# Patient Record
Sex: MALE | Race: OTHER | URBAN - METROPOLITAN AREA
[De-identification: names, ages, dates, MRNs, and addresses within clinical notes are randomized per-mention and may not be internally consistent; named-entity substitution may affect disease eponyms.]

---

## 2023-02-14 ENCOUNTER — EMERGENCY (EMERGENCY)
Facility: HOSPITAL | Age: 23
LOS: 0 days | Discharge: HOME | End: 2023-02-14
Attending: STUDENT IN AN ORGANIZED HEALTH CARE EDUCATION/TRAINING PROGRAM
Payer: COMMERCIAL

## 2023-02-14 VITALS
TEMPERATURE: 99 F | DIASTOLIC BLOOD PRESSURE: 88 MMHG | HEART RATE: 80 BPM | RESPIRATION RATE: 15 BRPM | OXYGEN SATURATION: 100 % | SYSTOLIC BLOOD PRESSURE: 146 MMHG

## 2023-02-14 VITALS
TEMPERATURE: 98 F | HEART RATE: 63 BPM | OXYGEN SATURATION: 95 % | RESPIRATION RATE: 18 BRPM | DIASTOLIC BLOOD PRESSURE: 100 MMHG | HEIGHT: 71 IN | SYSTOLIC BLOOD PRESSURE: 150 MMHG | WEIGHT: 199.96 LBS

## 2023-02-14 DIAGNOSIS — Z20.822 CONTACT WITH AND (SUSPECTED) EXPOSURE TO COVID-19: ICD-10-CM

## 2023-02-14 DIAGNOSIS — F32.9 MAJOR DEPRESSIVE DISORDER, SINGLE EPISODE, UNSPECIFIED: ICD-10-CM

## 2023-02-14 DIAGNOSIS — R53.1 WEAKNESS: ICD-10-CM

## 2023-02-14 DIAGNOSIS — F32.A DEPRESSION, UNSPECIFIED: ICD-10-CM

## 2023-02-14 DIAGNOSIS — F41.8 OTHER SPECIFIED ANXIETY DISORDERS: ICD-10-CM

## 2023-02-14 DIAGNOSIS — R29.700 NIHSS SCORE 0: ICD-10-CM

## 2023-02-14 LAB
ALBUMIN SERPL ELPH-MCNC: 3.8 G/DL — SIGNIFICANT CHANGE UP (ref 3.3–5)
ALP SERPL-CCNC: 84 U/L — SIGNIFICANT CHANGE UP (ref 40–120)
ALT FLD-CCNC: 34 U/L — SIGNIFICANT CHANGE UP (ref 12–78)
AMPHET UR-MCNC: NEGATIVE — SIGNIFICANT CHANGE UP
ANION GAP SERPL CALC-SCNC: 6 MMOL/L — SIGNIFICANT CHANGE UP (ref 5–17)
APAP SERPL-MCNC: < 2 UG/ML (ref 10–30)
APPEARANCE UR: CLEAR — SIGNIFICANT CHANGE UP
APTT BLD: 26.8 SEC — LOW (ref 27.5–35.5)
AST SERPL-CCNC: 30 U/L — SIGNIFICANT CHANGE UP (ref 15–37)
BARBITURATES UR SCN-MCNC: NEGATIVE — SIGNIFICANT CHANGE UP
BASOPHILS # BLD AUTO: 0.02 K/UL — SIGNIFICANT CHANGE UP (ref 0–0.2)
BASOPHILS NFR BLD AUTO: 0.3 % — SIGNIFICANT CHANGE UP (ref 0–2)
BENZODIAZ UR-MCNC: NEGATIVE — SIGNIFICANT CHANGE UP
BILIRUB SERPL-MCNC: 0.3 MG/DL — SIGNIFICANT CHANGE UP (ref 0.2–1.2)
BILIRUB UR-MCNC: NEGATIVE — SIGNIFICANT CHANGE UP
BUN SERPL-MCNC: 13 MG/DL — SIGNIFICANT CHANGE UP (ref 7–23)
CALCIUM SERPL-MCNC: 8.9 MG/DL — SIGNIFICANT CHANGE UP (ref 8.5–10.1)
CHLORIDE SERPL-SCNC: 108 MMOL/L — SIGNIFICANT CHANGE UP (ref 96–108)
CK SERPL-CCNC: 268 U/L — SIGNIFICANT CHANGE UP (ref 26–308)
CO2 SERPL-SCNC: 29 MMOL/L — SIGNIFICANT CHANGE UP (ref 22–31)
COCAINE METAB.OTHER UR-MCNC: NEGATIVE — SIGNIFICANT CHANGE UP
COLOR SPEC: YELLOW — SIGNIFICANT CHANGE UP
CREAT SERPL-MCNC: 1.01 MG/DL — SIGNIFICANT CHANGE UP (ref 0.5–1.3)
DIFF PNL FLD: NEGATIVE — SIGNIFICANT CHANGE UP
EGFR: 106 ML/MIN/1.73M2 — SIGNIFICANT CHANGE UP
EOSINOPHIL # BLD AUTO: 0.04 K/UL — SIGNIFICANT CHANGE UP (ref 0–0.5)
EOSINOPHIL NFR BLD AUTO: 0.6 % — SIGNIFICANT CHANGE UP (ref 0–6)
EPI CELLS # UR: SIGNIFICANT CHANGE UP
ETHANOL SERPL-MCNC: <10 MG/DL — SIGNIFICANT CHANGE UP (ref 0–10)
FLUAV AG NPH QL: SIGNIFICANT CHANGE UP
FLUBV AG NPH QL: SIGNIFICANT CHANGE UP
GLUCOSE BLDC GLUCOMTR-MCNC: 75 MG/DL — SIGNIFICANT CHANGE UP (ref 70–99)
GLUCOSE SERPL-MCNC: 81 MG/DL — SIGNIFICANT CHANGE UP (ref 70–99)
GLUCOSE UR QL: NEGATIVE MG/DL — SIGNIFICANT CHANGE UP
HCT VFR BLD CALC: 47.3 % — SIGNIFICANT CHANGE UP (ref 39–50)
HGB BLD-MCNC: 15 G/DL — SIGNIFICANT CHANGE UP (ref 13–17)
IMM GRANULOCYTES NFR BLD AUTO: 0.2 % — SIGNIFICANT CHANGE UP (ref 0–0.9)
INR BLD: 1.07 RATIO — SIGNIFICANT CHANGE UP (ref 0.88–1.16)
KETONES UR-MCNC: NEGATIVE — SIGNIFICANT CHANGE UP
LEUKOCYTE ESTERASE UR-ACNC: NEGATIVE — SIGNIFICANT CHANGE UP
LYMPHOCYTES # BLD AUTO: 2.91 K/UL — SIGNIFICANT CHANGE UP (ref 1–3.3)
LYMPHOCYTES # BLD AUTO: 45.8 % — HIGH (ref 13–44)
MCHC RBC-ENTMCNC: 26.5 PG — LOW (ref 27–34)
MCHC RBC-ENTMCNC: 31.7 G/DL — LOW (ref 32–36)
MCV RBC AUTO: 83.6 FL — SIGNIFICANT CHANGE UP (ref 80–100)
METHADONE UR-MCNC: NEGATIVE — SIGNIFICANT CHANGE UP
MONOCYTES # BLD AUTO: 0.58 K/UL — SIGNIFICANT CHANGE UP (ref 0–0.9)
MONOCYTES NFR BLD AUTO: 9.1 % — SIGNIFICANT CHANGE UP (ref 2–14)
NEUTROPHILS # BLD AUTO: 2.79 K/UL — SIGNIFICANT CHANGE UP (ref 1.8–7.4)
NEUTROPHILS NFR BLD AUTO: 44 % — SIGNIFICANT CHANGE UP (ref 43–77)
NITRITE UR-MCNC: NEGATIVE — SIGNIFICANT CHANGE UP
NRBC # BLD: 0 /100 WBCS — SIGNIFICANT CHANGE UP (ref 0–0)
OPIATES UR-MCNC: NEGATIVE — SIGNIFICANT CHANGE UP
PCP SPEC-MCNC: SIGNIFICANT CHANGE UP
PCP UR-MCNC: NEGATIVE — SIGNIFICANT CHANGE UP
PH UR: 8 — SIGNIFICANT CHANGE UP (ref 5–8)
PLATELET # BLD AUTO: 170 K/UL — SIGNIFICANT CHANGE UP (ref 150–400)
POTASSIUM SERPL-MCNC: 3.9 MMOL/L — SIGNIFICANT CHANGE UP (ref 3.5–5.3)
POTASSIUM SERPL-SCNC: 3.9 MMOL/L — SIGNIFICANT CHANGE UP (ref 3.5–5.3)
PROT SERPL-MCNC: 7.9 GM/DL — SIGNIFICANT CHANGE UP (ref 6–8.3)
PROT UR-MCNC: 15 MG/DL
PROTHROM AB SERPL-ACNC: 12.8 SEC — SIGNIFICANT CHANGE UP (ref 10.5–13.4)
RBC # BLD: 5.66 M/UL — SIGNIFICANT CHANGE UP (ref 4.2–5.8)
RBC # FLD: 14.1 % — SIGNIFICANT CHANGE UP (ref 10.3–14.5)
RBC CASTS # UR COMP ASSIST: NEGATIVE /HPF — SIGNIFICANT CHANGE UP (ref 0–4)
SALICYLATES SERPL-MCNC: <1.7 MG/DL — LOW (ref 2.8–20)
SARS-COV-2 RNA SPEC QL NAA+PROBE: SIGNIFICANT CHANGE UP
SODIUM SERPL-SCNC: 143 MMOL/L — SIGNIFICANT CHANGE UP (ref 135–145)
SP GR SPEC: 1.01 — SIGNIFICANT CHANGE UP (ref 1.01–1.02)
THC UR QL: NEGATIVE — SIGNIFICANT CHANGE UP
TROPONIN I, HIGH SENSITIVITY RESULT: 5.8 NG/L — SIGNIFICANT CHANGE UP
UROBILINOGEN FLD QL: NEGATIVE MG/DL — SIGNIFICANT CHANGE UP
WBC # BLD: 6.35 K/UL — SIGNIFICANT CHANGE UP (ref 3.8–10.5)
WBC # FLD AUTO: 6.35 K/UL — SIGNIFICANT CHANGE UP (ref 3.8–10.5)
WBC UR QL: NEGATIVE — SIGNIFICANT CHANGE UP

## 2023-02-14 PROCEDURE — 93010 ELECTROCARDIOGRAM REPORT: CPT

## 2023-02-14 PROCEDURE — 70496 CT ANGIOGRAPHY HEAD: CPT | Mod: 26,MA

## 2023-02-14 PROCEDURE — 70498 CT ANGIOGRAPHY NECK: CPT | Mod: 26,MA

## 2023-02-14 PROCEDURE — 0042T: CPT | Mod: MA

## 2023-02-14 PROCEDURE — 99285 EMERGENCY DEPT VISIT HI MDM: CPT

## 2023-02-14 PROCEDURE — 90792 PSYCH DIAG EVAL W/MED SRVCS: CPT | Mod: 95

## 2023-02-14 RX ORDER — SERTRALINE 25 MG/1
50 TABLET, FILM COATED ORAL
Qty: 1 | Refills: 0
Start: 2023-02-14

## 2023-02-14 NOTE — ED PROVIDER NOTE - OBJECTIVE STATEMENT
22 year old male with h/o depression (on zoloft) presents today biba from home reporting sudden onset of right sided weakness which started 20 min prior to his arrival, pt states that he is feeling anxious and found that he could not lift his arm due to heaviness, pt denies chest pain, sob, headache, visual or speech changes, pt

## 2023-02-14 NOTE — ED ADULT NURSE NOTE - CODE STROKE ACTIVATED DT
[Negative] : Genitourinary [FreeTextEntry1] : redness to the left great toe, flattening to back of head 14-Feb-2023 13:20

## 2023-02-14 NOTE — ED PROVIDER NOTE - PATIENT PORTAL LINK FT
You can access the FollowMyHealth Patient Portal offered by [facility] by registering at the following website: [email or web address]. By joining [employer]’s FollowMyHealth portal, you will also be able to view your health information using other applications (apps) compatible with our system. 3

## 2023-02-14 NOTE — ED BEHAVIORAL HEALTH ASSESSMENT NOTE - DETAILS
as above mom - anxiety childhood trauma, details deferred, reports feeling safe now see  Safety Plan note self referred

## 2023-02-14 NOTE — ED ADULT TRIAGE NOTE - CHIEF COMPLAINT QUOTE
Patient c/o right sided weakness, sudden onset, 12:20pm.  Denies PMH.  Seen by Dr. Mai in triage.  Code Stroke activated.

## 2023-02-14 NOTE — ED BEHAVIORAL HEALTH NOTE - BEHAVIORAL HEALTH NOTE
===================    PRE-HOSPITAL COURSE    ==================    SOURCE:  Joanna HARRIS, and ED documentation     DETAILS:  Pt Manuel d/t stroke symptoms/right sided weakness. Pt endorsing SI in ED.       ============    ED COURSE    ============    SOURCE: Joanna HARRIS, and secondhand ED documentation.    ARRIVAL: Per RN patient bibEMS to ED. Patient cooperative with triage process.     BELONGINGS:  Per RN, patient arrived with belongings. All belongings were provided to hospital security, and patient currently in a gown with a 1:1 staff member.    BEHAVIOR: RN described patient to be calm and cooperative, remains in behavioral and impulse control, and is not in restraints. Pt currently has 1:1 sitter.  Pt is not displaying aggression towards staff or others. Per RN, pt presenting with euthymic affect and mood is congruent with affect.   Pt has a slightly tangential but redirectable thought process with flight of ideas. RN stated that the patient is endorsing current SI/HI. Per RN pt denying A/VH.  There are no visible marks, bruises, or lacerations on the body. RN reported that the patient appears to be well groomed, has fair hygiene, and reports pt is IND with ADLs.     TREATMENT:  No medication administered. No restraints.     VISITORS: None currently     -----------------------------------------------   COVID Exposure Screen- collateral (i.e. third-party, chart review, belongings, etc; include EMS and ED staff)   ---------------------------------------------------   1. Has the patient had a COVID-19 test in the last 90 days? Unknown.   2. Has the patient tested positive for COVID-19 antibodies? Unknown.   3.Has the patient received 2 doses of the COVID-19 vaccine?  Unknown.   4. In the past 10 days, has the patient been around anyone with a positive COVID-19 test?* Unknown.   5.Has the patient been out of [place] within the past 10 days? Unknown.

## 2023-02-14 NOTE — ED PROVIDER NOTE - PROGRESS NOTE DETAILS
pt sitting up in bed eating, sitting with his legs crossed, has full strength of his upper and lower extremities, NIHSS-0 weakness resolved almost completely Results reported to patient--grossly benign, labs and imaging unremarkable, no stroke  Pt. reports feeling better after meds, now cleared by telepsych for d/c home--no SI/HI/hallucinations at this time; pt. wants to leave, states he feels well, no focal neuro deficits present at this time   pt. agrees to f/u with primary care outpt. asap, additional urgent referral given for psych f/u and neuro f/u given ER complaints   pt. understands to return to ED if symptoms worsen; will d/c

## 2023-02-14 NOTE — ED BEHAVIORAL HEALTH ASSESSMENT NOTE - HPI (INCLUDE ILLNESS QUALITY, SEVERITY, DURATION, TIMING, CONTEXT, MODIFYING FACTORS, ASSOCIATED SIGNS AND SYMPTOMS)
The patient is a 22-year-old male; domiciled with housemates; received college degree in visual arts; employed as an after-; denies PMHx; PPHx of depression, anxiety, no prior SIB/SA, no prior admissions; denies legal hx; hx of marijuana use; BIBA activated by self for somatic complaints; psychiatry consulted to evaluate for thoughts of self harm.  On telepsych assessment, pt noted to be engaged and laughing with 1:1 staff at bedside.  Pt states that he was having an anxiety attack, threw up, then had no feeling on his right side so he called the ambulance.  He states that those symptoms have resolved since arriving in the ED.  He denies any specific trigger, stating he was sleeping prior, and as he got up he felt he couldn't stand, felt tired and sweaty.  He feels psychiatry was consulted because he "mentioned a previous history of depression and anxiety and thoughts of self harm."  Pt states that 3 days ago he "had an episode in his room" where he attempted to try to cut using a sharp object (pen tip from art materials) on his wrist and ankles but states he did not go through with it and ended up going out skating instead.  He states that was the closest he ever got to cutting, denies that he was having suicidal intent at that time.  He states that he had thoughts to self harm again today but was "not trying to have that happen."  He notes that he called the suicide hotline a few days ago and they created a safety plan (pt readily able to repeat all the information from the safety plan), stating that if his coping skills failed he would reach out to friends/family, then look for professional help.  He states he had also reached out to his old therapist from [employer] today for advice and she suggested he come to the ED.  He denies current SI/HI or thoughts of self harm.  Pt reports "sporadic" times where he feels depressed; reports going to bed no later than midnight, wakes up once in the night to use the bathroom, usually can fall back to sleep; denies anhedonia; energy level fluctuates; lost ~20 lbs in past 6 months, reports exercising and trying to eat less but not intentionally trying to lose weight; normal concentration.  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  He states that sometimes he has trouble grounding himself in reality.  Pt gives consent for telepsych to speak with his boyfriend, friend, and mother (though states he does not keep her in the loop regarding his mental health).      Writer left  for pt's mother ([phone]) using [employer]  (ID# [id number]).    Writer spoke to pt's friend (Pat, [phone]), who identifies as a therapist.  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  Pt has recently been calling out of work over the past 2 weeks due to anxiety and feeling down.  Pt has recently started back on zoloft and she wonders if he is having a negative reaction.  She notes his living situation is not the greatest right now.  Pt expressed to her after the fact that he had SI over the weekend and spoke with the hotline to create a safety plan.  She believes pt might have a hx of SIB/SA but is not aware of any details.  Pt has been making plans for his bday next week and spends time with his partner and college friends, sometimes visiting his family as well.  She states pt just got insurance and is looking for a psychiatrist.  She is in the ED now with him and feels he is at baseline, notes that he has been reaching out for help when needed.  Pt might be able to stay with her if needed.  She does not feel like he requires admission but could benefit from having medications adjusted, which might happen sooner if he chooses to stay.    Covid Screen - Patient  testing? reports positive test ~1-2 months ago, possibly in November  vaccine? "fully vaccinated"  exposures? denies The patient is a 22-year-old male; domiciled with housemates; received college degree in visual arts; employed as an after-; denies PMHx; PPHx of depression, anxiety, no prior SIB/SA, no prior admissions; denies legal hx; hx of marijuana use; BIBA activated by self for somatic complaints; psychiatry consulted to evaluate for thoughts of self harm.  On telepsych assessment, pt noted to be engaged and laughing with 1:1 staff at bedside.  Pt states that he was having an anxiety attack, threw up, then had no feeling on his right side so he called the ambulance.  He states that those symptoms have resolved since arriving in the ED.  He denies any specific trigger, stating he was sleeping prior, and as he got up he felt he couldn't stand, felt tired and sweaty.  He feels psychiatry was consulted because he "mentioned a previous history of depression and anxiety and thoughts of self harm."  Pt states that 3 days ago he "had an episode in his room" where he attempted to try to cut using a sharp object (pen tip from art materials) on his wrist and ankles but states he did not go through with it and ended up going out skating instead.  He states that was the closest he ever got to cutting, denies that he was having suicidal intent at that time.  He states that he had thoughts to self harm again today but was "not trying to have that happen."  He notes that he called the suicide hotline a few days ago and they created a safety plan (pt readily able to repeat all the information from the safety plan), stating that if his coping skills failed he would reach out to friends/family, then look for professional help.  He states he had also reached out to his old therapist from [employer] today for advice and she suggested he come to the ED.  He denies current SI/HI or thoughts of self harm.  Pt reports "sporadic" times where he feels depressed; reports going to bed no later than midnight, wakes up once in the night to use the bathroom, usually can fall back to sleep; denies anhedonia; energy level fluctuates; lost ~20 lbs in past 6 months, reports exercising and trying to eat less but not intentionally trying to lose weight; normal concentration.  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  He states that sometimes he has trouble grounding himself in reality.  He states he missed some days of work last week due to not feeling well and anxiety.  Pt states he could stay with his friend, boyfriend, or grandfather.  Pt gives consent for telepsych to speak with his boyfriend, friend, and mother (though states he does not keep her in the loop regarding his mental health).      Writer left  for pt's mother ([phone]) using [employer]  (ID# [id number]).  Writer reached pt's mother on 2nd attempt ( ID #[id number]).  She states she just spoke with pt and he said that he was having physical symptoms, possibly caused by a pill he was given.  Pt has expressed not feeling well and not being able to go to work.  She denies that pt has ever expressed SI/SIB/SA to her.  When asked about FHx, she states she doesn't know what happened and nobody else is like that and only god knows the reason why.   got disconnected so writer called back (ID#[id number]).  She states her only concern is that the pill he was prescribed might be too strong.      Writer spoke to pt's friend (Pat, [phone]), who identifies as a therapist.  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  Pt has recently been calling out of work over the past 2 weeks due to anxiety and feeling down.  Pt has recently started back on zoloft and she wonders if he is having a negative reaction.  She notes his living situation is not the greatest right now.  Pt expressed to her after the fact that he had SI over the weekend and spoke with the hotline to create a safety plan.  She believes pt might have a hx of SIB/SA but is not aware of any details.  Pt has been making plans for his bday next week and spends time with his partner and college friends, sometimes visiting his family as well.  She states pt just got insurance and is looking for a psychiatrist.  She is in the ED now with him and feels he is at baseline, notes that he has been reaching out for help when needed.  Pt might be able to stay with her if needed.  She does not feel like he requires admission but could benefit from having medications adjusted, which might happen sooner if he chooses to stay.    Writer spoke to pt's partner (William, # in chart), who has known pt ~2 months.  He states that pt has been having anxiety related to his housing situation.  He states that pt's emotions can go up and down.  For example, they were having a great time in the park and then after pt got home he reported feeling depressed and at times having chest pains in waves.  Pt expressed having SI at times, thinking of using sharp things near him, but has not acting on it.  Pt has a safety plan to use when he has those thoughts.  He feels pt is safe to leave and they will try to help pt get an appt with his new insurance ([employer]).  He will ask his mom if pt is able to stay with them temporarily.  He notes that pt spoke with his mother while in the ED and informed her of what's going on and she plans to inform other family members.    Covid Screen - Patient  testing? reports positive test ~1-2 months ago, possibly in November  vaccine? "fully vaccinated"  exposures? denies The patient is a 22-year-old male; domiciled with housemates; received college degree in visual arts; employed as an after-; denies PMHx; PPHx of depression, anxiety, no prior SIB/SA, no prior admissions; denies legal hx; hx of marijuana use; BIBA activated by self for somatic complaints; psychiatry consulted to evaluate for thoughts of self harm.  On telepsych assessment, pt noted to be engaged and laughing with 1:1 staff at bedside.  Pt states that he was having an anxiety attack, threw up, then had no feeling on his right side so he called the ambulance.  He states that those symptoms have resolved since arriving in the ED.  He denies any specific trigger, stating he was sleeping prior, and as he got up he felt he couldn't stand, felt tired and sweaty.  He feels psychiatry was consulted because he "mentioned a previous history of depression and anxiety and thoughts of self harm."  Pt states that 3 days ago he "had an episode in his room" where he attempted to try to cut using a sharp object (pen tip from art materials) on his wrist and ankles but states he did not go through with it and ended up going out skating instead.  He states that was the closest he ever got to cutting, denies that he was having suicidal intent at that time.  He states that he had thoughts to self harm again today but was "not trying to have that happen."  He notes that he called the suicide hotline a few days ago and they created a safety plan (pt readily able to repeat all the information from the safety plan), stating that if his coping skills failed he would reach out to friends/family, then look for professional help.  He states he had also reached out to his old therapist from [facility] today for advice and she suggested he come to the ED.  He denies current SI/HI or thoughts of self harm.  Pt reports "sporadic" times where he feels depressed; reports going to bed no later than midnight, wakes up once in the night to use the bathroom, usually can fall back to sleep; denies anhedonia; energy level fluctuates; lost ~20 lbs in past 6 months, reports exercising and trying to eat less but not intentionally trying to lose weight; normal concentration.  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  He states that sometimes he has trouble grounding himself in reality.  He states he missed some days of work last week due to not feeling well and anxiety.  Pt states he could stay with his friend, boyfriend, or grandfather.  Pt gives consent for telepsych to speak with his boyfriend, friend, and mother (though states he does not keep her in the loop regarding his mental health).      Writer left  for pt's mother ([phone]) using [employer]  (ID# [id number]).  Writer reached pt's mother on 2nd attempt ( ID #[id number]).  She states she just spoke with pt and he said that he was having physical symptoms, possibly caused by a pill he was given.  Pt has expressed not feeling well and not being able to go to work.  She denies that pt has ever expressed SI/SIB/SA to her.  When asked about FHx, she states she "doesn't know what happened and nobody else is like that and only god knows the reason why."   got disconnected so writer called back (ID#[id number]).  She states her only concern is that the pill he was prescribed might be too strong.      Writer spoke to pt's friend (Pat, [phone]), who identifies as a therapist.  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  Pt has recently been calling out of work over the past 2 weeks due to anxiety and feeling down.  Pt has recently started back on zoloft and she wonders if he is having a negative reaction.  She notes his living situation is not the greatest right now.  Pt expressed to her after the fact that he had SI over the weekend and spoke with the hotline to create a safety plan.  She believes pt might have a hx of SIB/SA but is not aware of any details.  Pt has been making plans for his bday next week and spends time with his partner and college friends, sometimes visiting his family as well.  She states pt just got insurance and is looking for a psychiatrist.  She is in the ED now with him and feels he is at baseline, notes that he has been reaching out for help when needed.  Pt might be able to stay with her if needed.  She does not feel like he requires admission but could benefit from having medications adjusted, which might happen sooner if he chooses to stay.    Writer spoke to pt's partner (William, # in chart), who has known pt ~2 months.  He states that pt has been having anxiety related to his housing situation.  He states that pt's emotions can go up and down.  For example, they were having a great time in the park and then after pt got home he reported feeling depressed and at times having chest pains in waves.  Pt expressed having SI at times, thinking of using sharp things near him, but has not acting on it.  Pt has a safety plan to use when he has those thoughts.  He feels pt is safe to leave and they will try to help pt get an appt with his new insurance ([employer]).  He will ask his mom if pt is able to stay with them temporarily.  He notes that pt spoke with his mother while in the ED and informed her of what's going on and she plans to inform other family members.    Covid Screen - Patient  testing? reports positive test ~1-2 months ago, possibly in November  vaccine? "fully vaccinated"  exposures? denies

## 2023-02-14 NOTE — ED PROVIDER NOTE - CARE PROVIDER_API CALL
Suraj Herring)  Clinical Neurophysiology; Neurology  [address], [address]  [place] [address]  Phone: [phone]  Fax: [phone]  Follow Up Time: Urgent

## 2023-02-14 NOTE — ED PROVIDER NOTE - NSFOLLOWUPCLINICS_GEN_ALL_ED_FT
[facility]  Psychiatry  [address] [place] [address]  Phone: [phone]  Fax:   Follow Up Time: Urgent

## 2023-02-14 NOTE — ED BEHAVIORAL HEALTH ASSESSMENT NOTE - SUMMARY
The patient is a 22-year-old male; domiciled with housemates; received college degree in visual arts; employed as an after-; denies PMHx; PPHx of depression, anxiety, no prior SIB/SA, no prior admissions; denies legal hx; hx of marijuana use; BIBA activated by self for somatic complaints; psychiatry consulted to evaluate for thoughts of self harm.  Pt denies current SI/HI and does not appear acutely depressed, psychotic, manic, anxious, agitated, or intoxicated.  He declines offer for voluntary admission and does not meet criteria for involuntary admission at this time as outpatient setting is the least restrictive setting.

## 2023-02-14 NOTE — ED ADULT NURSE NOTE - ED STAT RN HANDOFF DETAILS
Report endorsed to oncoming RN  Shad for my break coverage. Report given to covering RN and patient informed during rounding Safety checks compld this shift/Safety rounds completed hourly.  Fall +skin precs in place. Any issues endorsed to oncoming RN for follow up. RN Assumed patient's care for coverage.

## 2023-02-14 NOTE — ED BEHAVIORAL HEALTH ASSESSMENT NOTE - RISK ASSESSMENT
risk factors: male, single, psych hx, not engaged in tx currently, hx of thoughts of self harm    protective factors: help seeking, good support system, domiciled, employed, denies active SI/HI, denies hx of SIB/SA, denies access to guns, motivated for outpatient treatment, future oriented, identifies reasons to live, able to safety plan

## 2023-02-14 NOTE — ED BEHAVIORAL HEALTH ASSESSMENT NOTE - REFERRAL / APPOINTMENT DETAILS
pt states he has a new therapy appt next week; additional outpatient mental health and crisis resources faxed

## 2023-02-14 NOTE — ED BEHAVIORAL HEALTH ASSESSMENT NOTE - SAFETY PLAN ADDT'L DETAILS
Safety plan discussed with.../Education provided regarding environmental safety / lethal means restriction/Provision of National Suicide Prevention Lifeline 7-702-622-OPDE (9236)

## 2023-02-14 NOTE — ED ADULT NURSE REASSESSMENT NOTE - NS ED NURSE REASSESS COMMENT FT1
Md Sergei Mendoza made aware patient states depression and suicidal thoughts today. patient did not have a actual plan.

## 2023-02-14 NOTE — ED ADULT NURSE NOTE - OBJECTIVE STATEMENT
Patient brought to ED by EMS for right sided weakness, concern for CVA.  Patient brought directly to CT and then returned to ED.  On return to ED, patient states weakness is resolving.

## 2023-02-14 NOTE — ED PROVIDER NOTE - CLINICAL SUMMARY MEDICAL DECISION MAKING FREE TEXT BOX
pt presented today initially with right sided weakness and anxiety, evaluated for stroke, ct negative for cva or large vessel occlusion, pt's symptoms did resolved while in the ER, pt not a candidate for tpa due to his rapidly resolving symptoms, his weakness likely related to his anxiety, pt did admit to feeling anxious and depressed and having suicidal thoughts without any discreet plans, pt admits to using a sharp object three days ago thinking about cutting himself but never did, pt placed on a 1:1 and TP called for evaluation, pending evaluation and disposition, case signed out to the oncoming physician

## 2023-02-14 NOTE — ED BEHAVIORAL HEALTH ASSESSMENT NOTE - PATIENT'S CHIEF COMPLAINT
"I was having an anxiety attack and I fell to the floor and threw up and then had no feeling on my right side so I called the ambulance."

## 2024-08-23 PROBLEM — F41.9 ANXIETY DISORDER, UNSPECIFIED: Chronic | Status: ACTIVE | Noted: 2023-02-14

## 2024-11-25 NOTE — ED ADULT NURSE NOTE - NIH STROKE SCALE: 6B. MOTOR LEG, RIGHT, QM
Lab draw Right AC 23 x 3/4\". Venipuncture x 1   (2) Some effort against gravity; leg falls to bed by 5 secs, but has some effort against gravity